# Patient Record
Sex: MALE | Race: WHITE | NOT HISPANIC OR LATINO | Employment: FULL TIME | URBAN - METROPOLITAN AREA
[De-identification: names, ages, dates, MRNs, and addresses within clinical notes are randomized per-mention and may not be internally consistent; named-entity substitution may affect disease eponyms.]

---

## 2016-12-27 NOTE — PROGRESS NOTES
MICD Discharge Summary/Instructions     Patient: Amish Guerrero  MRN: [id number]   : 1990 Age: 26 year old Sex: male   -  Focus of Treatment / Discharge Recommendations    Personal Safety/ Management of Symptoms    * Call crisis lines as needed    [facility] [phone]                [facility] [phone]  [facility] [phone]              [facility] [phone]  [facility] [phone]              [facility] [phone]  [facility] [phone]          National Suicide Prevention [phone]  [facility] [phone]            Suicide Prevention [phone]  [facility] [phone]      Abstinence/Relapse Prevention  * Take all medicines as directed.  Carry a current list of medicines with you.  * Use coping skills:  1) Meditate daily;  2) Exercise regularly;  3) Follow good nutrition;  4) Read recovery literature daily such as the Big Book, 12 x 12, and/or a daily meditation book;  5) Talk to others about your feelings and what is bothering you;  6) Practice the Twelve Steps daily (that is, at least one step each day) and complete your fourth and fifth steps within nine to twelve months of discharge.      7) Abstain from the use of all mood altering chemicals (including over the counter medications/substances such as Nyquil, Robitussin, and mouthwashes containing alcohol) except for those prescribed and monitored by a physician knowledgeable of your chemical dependency diagnosis.     Develop/Improve Independent Living/Socialization Skills: Don't forget to be assertive when you need to.     Community Resources/Supports and Discharge Planning:  See below    Continue to manage your mental health issues by doing the following:    * Following all directives of your psychiatrist in terms of medications and in terms of follow up appointments.   * Continue your psychotherapy to deal with the issues identified in your treatment plan and  "any other issues that could effect your recovery.   * Use the techniques you have learned in group including, but not limited to, REBT to curb negative thoughts before they spiral out of control.     Attend two to three community based support groups weekly, which may include AA, NA, and/or [place] [facility] Phase III [facility] Group. To enroll in Phase III Growth Group, at no charge, call the [facility] Office at [phone].  For updated AA meeting information go to [email or web address] or [email or web address].   See your medical doctor about:  1) See your doctor for regular physical exams and your dentist for regular dental care and follow all recommendations; and,  2) Inform both your current and future doctors and dentists of your chemical dependency diagnosis.      Other:   Stay Positive and don't catastrophize (by predicting a negative future.)    Remember Ex Adrian Bonum: \"Out of Bad Comes Good.\"      Remember self forgiveness.    Remember the consequences of your using.    Remember to treat yourself well.    Remember to stay away from people, places and/or events that are risky to your sobriety.    Stay in the \"here and now.\" Focus on the moment at hand.    Remember HALT.    Lean on your sober friends when triggered.     If you think about drinking, envision the negative consequences of a relapse.     Identify what you want in life and don't let anything get in your way.       The above were prepared with input from the client.        Client Signature:_______________________   Date / Time:___________      Staff Signature:________________________   Date / Time:___________                "

## 2017-01-02 ENCOUNTER — HOSPITAL ENCOUNTER (OUTPATIENT)
Dept: BEHAVIORAL HEALTH | Facility: CLINIC | Age: 27
End: 2017-01-02
Attending: SOCIAL WORKER
Payer: COMMERCIAL

## 2017-01-02 PROCEDURE — H2035 A/D TX PROGRAM, PER HOUR: HCPCS | Mod: HQ

## 2017-01-03 NOTE — PROGRESS NOTES
Patient:  Amish Guerrero           Session #25  (includes orientation and treatment plan meeting)         Phase II Session 12    Key to reading dimensions:     Bolded text in a dimension indicates information about a client at intake.    Underlined portions in a dimension indicate important information I want to carry forward from week to week as a reminder.           Adult CD Progress Note and Treatment Plan Review     Attendance  Client attended group therapy on  Monday 1/2/17  for 1.5 hours    Support group attended this week:  yes    Reporting sobriety:  Since 8/21/16.    yes  Treatment Plan     Treatment Plan Review completed on:  1/2/17     Client preferred learning style: Hands on  Demonstration    Staff Members contributing Olimpia              Received Supervision: No     Client: contributed to goals and plan.    Client received copy of plan/revised plan: yes    Client agrees with plan/revised plan: yes.     Changes to Treatment Plan: No    New Goals added since last review: No    Goals worked on since last review:  Dimension  Four, five and six      Strategies effective: yes      Strategies need these changes: n/a    ASAM Risk Rating:    Dimension 1 0 . Client's last use was 8/21/16.       Dimension 2 1 . Client has a history of IBS. It is not being medicated and it is stable. Client's last physical was over a year ago. I urged the client to see a PCP about a prescription for naltrexone and antabuse. Since he didn't have a PCP, I referred him to Dr. Portillo.   9/30: client completed his physical. He had slightly elevated liver enzymes. He was told that would return to normal with continued sobriety. Suggested again that he call his pcp for naltrexone given his cravings. He said he would.    1/2/17:  Gadiel's cravings were a one on a scale of 10.       Dimension 3 1 . Client reports he has a previous diagnosis of depression and anxiety and reports two panic attacks in the past six months. He had suicidal  "ideation last fall but has not had any ideation in the last 7-8 months. He denies suicidal ideation or intent to self harm. He was given the suicide resource list. I gave him referrals to two psychiatrists and asked him to make an appointment asap and explained the ratonale.   9/23/16: I spoke to client and urged client to make a psychiatry and therapy appointment.   9/27:    He is now working to get a psychiatry appointment and has his first therapy appointment on 9/30. He signed a release to his therapist and I left a message asking her to call. I also faxed this note to her. I subsequently spoke to her.      10/5/16:  Gadiel reported his depression and anxiety were at at 2 of 10 and he had no intent to harm himself.   He said he was \"busy making a fun weekend for his girl friend and didn't have time to be alone\".  He stated he \"was a little down on last Thursday\" but he has been \" happy and positive since then.\"   Client also reports \"I'm not a anxious about being laid off anymore\".  The client said he saw his therapist  on 9/30/16.  He stated \" she thinks I should see a therapist on a regular basis.\"  Gadiel stated he is \"upset\" by this comment because \" I don't want to delve into my past.  I accept my past and it has made me into who I am today and I like myself now as is.\"   Client stated he saw psychiatrist Dr. Ragsdale  who told Gadiel  he \"was mostly suffering  from withdrawal symptoms\" and that  \" I was on low scale for depression and low to medium for ADHD. \"  Client will return to see Dr. Mcbride in 6 weeks.   I spoke to Dr. Powell and he reported as follows: Client seemed to be under reporting. Didn't have enough symptoms to say he has anything current.   He does have MDD, recurrent, in remission and PETEY in remission. He has possible ADHD. He does have  persistent depressive disorder. No medications were prescribed at this time.  Staffed case with CAMDEN Bond. Based on the staffing, I told client that we " "would not agree with him that it is a good idea to  not do therapy as recommended, but it is his choice.      Client met with his psychiatrist on 10/4..    10/17/16:  Gadiel  reported his depression and anxiety  at 3 out of 10 because  \" I've gotten really upset about forgetting obligations, telling people I can't make it.  No real consequences yet.\"       He also stated he had some worsening problems with memory issues and that \" I need to start writing everything down.\" He also forgot his court date. I encouraged him to call Dr. Powell about it. He said he already had and he will be doing some ADHD testing. I affirmed him for being proactive on this.     10/24/16:   Gadiel said he made an appointment for 11/16 with his psychiatrist to get tested for ADD.   Client reported \"I'm concerned about my forgetfulness.\" I affirmed him on meeting this issue head on.     11/21/16:    He was tested by Dr. Titus for ADHD on 11/16.. He will get the results. He saw his therapist on 11/17.      12/5/16:   He went to an appointment with Dr. Powell on 11/29 and  he saw Dr. Titus on 11/30.    Client reports that he does not have ADHD, but that Dr. Powell, nevertheless, prescribed Vyvanse to help him with organization and  motivation.  Client said he has already felt a favorable response to the medication.     1/2/17: Client reported no anxiety or depression:   He denies any intent to harm himself or others.  He will be seeing Dr. Powell on 1/3.    Dimension 4 1. Client is externally motivated to treatment by his DWI. He is precontemplative. He said he didn't think his drinking was that bad. I tried to disabuse him of that.     1/2/17: He still asserts that he has maximum motivation for sobriety.      In order to develop motivation for change and end lingering thoughts of using, the client listened to a \"love letter\" from his drug of choice. Thereafter, he wrote a \"goodbye letter\" to his drug of choice. He then discussed " his feelings about the letter he wrote and he discussed the themes of the letters written by his peers and he read his letter to the group.     Dimension 5 2 .  Client has had no prior treatments. Therefore, he lacks the education, cognitions and behaviors needed for long term sobriety. He has continued to drink to the point he could not drive to work the next day due to setting off the interlock in his car. I suggested he enter residential treatment. He didn't want to do that.      1/2/17: Client told his girlfriend he is not ready to make a marriage commitment. He was affirmed by the group. In addition, he discussed his recovery care transition assignment.        Dimension 6 2 . Client has legal issues pending. He lives alone. He lacks a sober support system.     10/17/16:   Client will miss family skills group on 10/18 so I offered him the option to reschedule and to let me know.      1/2/17:   [place] attended his support group meeting.      Family Involvement:   none schedule this week ; family is scheudled for 10/24 and 10/26.*good insight client did actively participate      Intervention:   Cognitive Behavioral Therapy  Counselor feedback  Motivational Enhancement Therapy  Relapse prevention      Assessment:   Stages of Change Model  Preparation/Determination    Appears/Sounds:  Cooperative  Motivated  Engaged     Plan:    Focus on recovery environment       Client will re-read his letter at least twice. He was transitioned from group.     Thee MOSELEY, M.A., LEILA.

## 2017-01-06 NOTE — ADDENDUM NOTE
Encounter addended by: Thee Doan LADC on: 1/6/2017 10:40 AM<BR>     Documentation filed: Fast Note, Episodes

## 2017-03-01 ENCOUNTER — TELEPHONE (OUTPATIENT)
Dept: BEHAVIORAL HEALTH | Facility: CLINIC | Age: 27
End: 2017-03-01

## 2017-03-02 NOTE — TELEPHONE ENCOUNTER
Client called saying he was curious why alcohol had such a hold on him and he drank three days in a row at the end of January. Then he was sober for two weeks and then drank three days in a row again. Then he drank Sunday night and missed a family event on Monday. He said he hadn't been to a meeting in a month. .     I recommended that he get back into treatment. He didn't want to do that. I then recommended he seek antabuse and naltrexone and do 90 meetings in 90 days.

## 2017-04-25 ENCOUNTER — TELEPHONE (OUTPATIENT)
Dept: BEHAVIORAL HEALTH | Facility: CLINIC | Age: 27
End: 2017-04-25

## 2017-04-25 NOTE — TELEPHONE ENCOUNTER
Client called about his brother who has stopped taking suboxone and is using heroin and other opioids once again. He was looking for suggestions. I gave him these:  1) Get brother in for an assessment. Advised him about Rule 25s being free if finances are an issue.  2) Call  and see if the  will start commitment proceedings.   3) Tell parents to tell brother he can't stay with them if he won't go to treatment.

## 2019-12-04 NOTE — PROGRESS NOTES
RECOVERY SERVICES DISCHARGE SUMMARY       EVALUATION COUNSELOR:  Shawn Hanks [facility].   TREATMENT COUNSELOR:  Thee Doan [facility], MA, [place].   REFERRAL SOURCE:  Chente Gongora.   PROGRAM:  [facility].   ADMISSION DATE:  8/16/2016    LAST SESSION DATE:  01/2/2017    DISCHARGE DATE:  01/2/2017.   ADMISSION DIAGNOSES:   1.  303.90/F10.20, alcohol use disorder, severe.   2.  305.10/F17.200, tobacco use disorder, severe.   3.  304.30/F12.20, cannabis use disorder, moderate.   DISCHARGE DIAGNOSES:   1.  303.90/F10.20, alcohol use disorder, severe, in early remission.   2.  305.10/F17.200, tobacco use disorder, severe.   3.  304.30/F12.20, cannabis use disorder, moderate, in early remission.   4.  Major depressive disorder, recurrent, in remission, per his psychiatrist, Dr. Jean Powell.   5.  Generalized anxiety disorder in remission, per his psychiatrist, Dr. Jean Powell.   6.  Persistent depressive disorder per his psychiatrist, Dr. Jean Powell.   DISCHARGE STATUS:  With staff approval as the client substantially completed his treatment plan and the program's attendance requirements.   LAST USE DATE:  As reported by client:  08/21/2016   HOURS OF TREATMENT COMPLETED:  66.      PRESENTING INFORMATION:  At the time of admission, Mr. Amish Guerrero was a 25-year-old single male who received a DWI on 06/13/2016, at which time his blood alcohol concentration was 0.19.  The client has never been  and he had no children.  He was employed at the inception of treatment.      SERVICES PROVIDED:  Mr. Guerrero and the undersigned prepared a treatment plan, which will be discussed in more detail below.  In addition, he attended group therapy sessions during which time he was exposed to a variety of therapeutic techniques including, but not limited to, the following:  Aftercare planning, behavior modification, cognitive behavioral therapy, counselor feedback, education, emotional management, group  feedback, motivational enhancement therapy, relapse prevention, 12-step facilitation, mental health education, rational emotive behavioral therapy, logo therapy, existentialism and expressive art therapy.  Furthermore, he attended various skills groups, which were intended to assist him in developing sober living skills.  He attended the following skills groups:  Anger management and assertiveness training; meditation and stress reduction; positive psychology;  body image and self-esteem;  mental health education;  relapse prevention; and exercise, nutrition, and tobacco cessation.      ISSUES ADDRESSED IN TREATMENT   DIMENSION 1:  Intoxication/withdrawal:  The client's initial risk rating was 0 and his discharge risk rating was 0.  In this dimension, the client was treatment planned on 8/30/2016.  At that time, he reported that his last alcohol use was on 08/21/2016.  No treatment planning issues were implicated in this dimension at the outset.  Moreover, the client reported no chemical use while in treatment as he agreed to do if that happened.      DIMENSION 2:  Client's initial risk rating was 1 and his discharge risk rating was 1.  In this dimension, the client was treatment planned around the fact that he had not had a medical evaluation in the past year.  The client followed through on this recommendation on 9/16/2016 and completed his evaluation.  He reported that he was told that he had slightly elevated liver enzymes, but that they would return to normal with continued sobriety.  In addition, because the client was experiencing cravings, I recommended that he speak with his physician about a prescription for naltrexone and Antabuse.  The client did not follow through on this recommendation.  However, cravings did not appear to be a problematic issue with him as treatment progressed.  The client was also treated planned around the fact that he smoked cigarettes.  For this, I gave him materials to assist him  "in quitting smoking, I had him attend the exercise, nutrition, and tobacco cessation skills group and I recommended that he speak with his physician about a prescription for Chantix or Zyban.  While he did obtain information about these medications, he did not follow through and obtain a prescription.      DIMENSION 3:  Emotional/behavioral:  The client's initial risk rating was 1 and his discharge risk rating was 1.  In this dimension, the client was treatment planned around the fact that he had a history of suicidal ideation.  For this, he agreed to advise his counselor immediately if he felt like he wanted to hurt himself or if he was feeling suicidal; and, if his counselor was not around, to call 911 immediately.  The client agreed to this.  He reported no suicidal ideation while in treatment.  Moreover, the client was treatment around his history of anxiety, depression and panic attacks which are relapse triggers.  For this, he and I discussed the interrelationship between relapse and mental health issues and I referred him to psychiatry and therapy.  He met with Dr. Powell, psychiatrist, at least 3 times.  He also met with his therapist approximately 10 times while he was in treatment.  He signed releases with both providers and I coordinated care.  While in treatment, the client was tested for ADD, but he was found not to have that disorder.     Additionally, the client was treatment planned around how he would benefit from a regular exercise regimen  in order to improve his mental health and reduce his risk of relapse.  For this, the client agreed to exercise 3 days per week, 20 minutes per day.  He was substantially compliant in this regard.      DIMENSION 4:  Treatment readiness:  The client's initial risk rating was 3 and his discharge risk rating was 0.  In this dimension, the client was treatment planned around his desire to be alcohol free so that he can \"run a full life.\"  The interventions " prescribed for the client are set forth in the progress notes and treatment plan.  The client had very good insights into his chemical use and its repercussions on him and others.  Based on the treatment of his mental health issues, his attendance at support group meetings and his comments in group, it appeared that the client was extremely motivated for sobriety.      DIMENSION 5:  Relapse potential:  The client's initial risk rating was 4 and his discharge risk rating was 1.  In this dimension, the client was treatment planned around the fact that he lacked the cognitions and behaviors needed for long-term sobriety.  For this, I recommended that he attend all of scheduled skills group and complete his recovery care transition assignment, which he did.  Other interventions for this client in this dimension are set forth in the progress notes.      DIMENSION 6:  Recovery environment:  The client's initial risk rating was 2 and his discharge risk rating was 2.  In this dimension, the client was treatment planned around the fact that he lacked a sober support network.  For this, I asked him to attend at least 1 sober support group meeting a week.  The client went to support group meetings approximately 14 times.  I would have reduced the client's risk rating to zero, but for the fact that he had outstanding legal issues at the time of discharge.  In addition, the client was treatment planned around how he would like to be sober and fill his brother with hope and show others he is trying to make a change.  For this, I recommended that the client attend the family roles and communication workshop and the family program.  He attended the latter with his significant other, but not the former.  I offered him opportunities to make up the group that he missed but he declined.      STRENGTHS:  The client had a number of strengths including his friendly personality and his motivation for sobriety.      PROGNOSIS:  Favorable  assuming all continuing care recommendations are followed.      LIVING ARRANGEMENTS AT DISCHARGE:  The client was discharged to his home where he was living on his own        CONTINUING CARE RECOMMENDATIONS:    Personal Safety/ Management of Symptoms    * Call crisis lines as needed    [facility] [phone]                [facility] [phone]  [facility] [phone]              [facility] [phone]  [facility] [phone]              [facility] [phone]  [facility] [phone]          National Suicide Prevention [phone]  [facility] [phone]            Suicide Prevention [phone]  [facility] [phone]      Abstinence/Relapse Prevention  * Take all medicines as directed.  Carry a current list of medicines with you.  * Use coping skills:  1) Meditate daily;  2) Exercise regularly;  3) Follow good nutrition;  4) Read recovery literature daily such as the Big Book, 12 x 12, and/or a daily meditation book;  5) Talk to others about your feelings and what is bothering you;  6) Practice the Twelve Steps daily (that is, at least one step each day) and complete your fourth and fifth steps within nine to twelve months of discharge.       7) Abstain from the use of all mood altering chemicals (including over the counter medications/substances such as Nyquil, Robitussin, and mouthwashes containing alcohol) except for those prescribed and monitored by a physician knowledgeable of your chemical dependency diagnosis.     Develop/Improve Independent Living/Socialization Skills: Don't forget to be assertive when you need to.     Community Resources/Supports and Discharge Planning:  See below    Continue to manage your mental health issues by doing the following:                * Following all directives of your psychiatrist in terms of medications and in terms of follow up appointments.              * Continue your psychotherapy to deal with the issues  "identified in your treatment plan and any other issues that could effect your recovery.              * Use the techniques you have learned in group including, but not limited to, REBT to curb negative thoughts before they spiral out of control.     Attend two to three community based support groups weekly, which may include AA, NA, and/or [facility] Phase III Growth Group. To enroll in Phase III Growth Group, at no charge, call the Alumni Office at [phone].  For updated AA meeting information go to [email or web address] or [email or web address].   See your medical doctor about:  1) See your doctor for regular physical exams and your dentist for regular dental care and follow all recommendations; and,  2) Inform both your current and future doctors and dentists of your chemical dependency diagnosis.      Other:    Stay Positive and don't catastrophize (by predicting a negative future.)    Remember Ex Adrian Bonum: \"Out of Bad Comes Good.\"        Remember self forgiveness.    Remember the consequences of your using.    Remember to treat yourself well.    Remember to stay away from people, places and/or events that are risky to your sobriety.    Stay in the \"here and now.\" Focus on the moment at hand.    Remember HALT.    Lean on your sober friends when triggered.     If you think about drinking, envision the negative consequences of a relapse.     Identify what you want in life and don't let anything get in your way.       The above were prepared with input from the client.          This information has been disclosed to you from records protected by Federal confidentiality rules (42 CFR part 2). The Federal rules prohibit you from making any further disclosure of this information unless further disclosure is expressly permitted by the written consent of the person to whom it pertains or as otherwise permitted by 42 CFR part 2. A general authorization for the release of medical or other information is NOT sufficient " for this purpose. The Federal rules restrict any use of the information to criminally investigate or prosecute any alcohol or drug abuse patient.      ABA SANTAMARIA JD, MA, [facility]             D: 2017 14:26   T: 2017 21:47   MT: [id number]      Name:     CLARENCE ENCINAS   MRN:      [id number]        Account:      [id number]   :      1990           Visit Date:   2017      Document: [id number]     [Father] : father [Eats healthy meals and snacks] : eats healthy meals and snacks [Eats meals with family] : eats meals with family [Normal] : Normal [Yes] : Patient goes to dentist yearly [Vitamin] : Primary Fluoride Source: Vitamin [Grade ___] : Grade [unfilled] [Adequate social interactions] : adequate social interactions [Adequate behavior] : adequate behavior [Adequate performance] : adequate performance [No difficulties with Homework] : no difficulties with homework [No] : No cigarette smoke exposure [Gun in Home] : no gun in home [Exposure to tobacco] : no exposure to tobacco [Exposure to alcohol] : no exposure to alcohol [Exposure to electronic nicotine delivery system] : No exposure to electronic nicotine delivery system [Exposure to illicit drugs] : no exposure to illicit drugs [Appropriately restrained in motor vehicle] : appropriately restrained in motor vehicle [Supervised outdoor play] : supervised outdoor play [Supervised around water] : supervised around water [Wears helmet and pads] : wears helmet and pads [Parent knows child's friends] : parent knows child's friends [Parent discusses safety rules regarding adults] : parent discusses safety rules regarding adults [Family discusses home emergency plan] : family discusses home emergency plan [Monitored computer use] : monitored computer use [FreeTextEntry7] : 10 year well visit

## 2023-03-24 ENCOUNTER — LAB (OUTPATIENT)
Dept: LAB | Facility: CLINIC | Age: 33
End: 2023-03-24
Payer: COMMERCIAL

## 2023-03-24 DIAGNOSIS — Z31.41 ENCOUNTER FOR SPERM COUNT FOR FERTILITY TESTING: ICD-10-CM

## 2023-03-24 PROCEDURE — 89322 SEMEN ANAL STRICT CRITERIA: CPT

## 2023-03-28 LAB
ABNORMAL SPERM MORPHOLOGY: 98
ABSTINENCE DAYS: 5 DAYS (ref 2–7)
AGGLUTINATION: NO
ANALYSIS TEMP - CENTIGRADE: 23 CENTIGRADE
COLLECTION METHOD: ABNORMAL
COLLECTION SITE: ABNORMAL
CONSENT TO RELEASE TO PARTNER: YES
DAL- RECEIVED TIME: ABNORMAL
HEAD DEFECT: 98 %
IMMOTILE: 23 %
LIQUEFIED: YES
MIDPIECE DEFECT: 45 %
NON-PROGRESSIVE MOTILITY: 12 %
NORMAL SPERM MORPHOLOGY: 2 % NORMAL FORMS
PROGRESSIVE MOTILITY: 65 %
ROUND CELLS: <0.1 MILLION/ML
SPECIMEN PH: 7.6 PH
SPECIMEN VOLUME: 2.9 ML
SPERM CONCENTRATION: 5 MILLION/ML
TAIL DEFECT: 7 %
TIME OF ANALYSIS: ABNORMAL
TOTAL PROGRESSIVE MOTILE NUMBER: 10 MILLION
TOTAL SPERM NUMBER: 15 MILLION
VISCOUS: NO
VITALITY: ABNORMAL

## 2023-05-03 ENCOUNTER — LAB (OUTPATIENT)
Dept: LAB | Facility: CLINIC | Age: 33
End: 2023-05-03
Payer: COMMERCIAL

## 2023-05-03 DIAGNOSIS — Z31.41 ENCOUNTER FOR SPERM COUNT FOR FERTILITY TESTING: ICD-10-CM

## 2023-05-03 PROCEDURE — 89322 SEMEN ANAL STRICT CRITERIA: CPT

## 2023-05-04 LAB
ABNORMAL SPERM MORPHOLOGY: 96
ABSTINENCE DAYS: 4 DAYS (ref 2–7)
AGGLUTINATION: NO
ANALYSIS TEMP - CENTIGRADE: 22 CENTIGRADE
COLLECTION METHOD: ABNORMAL
COLLECTION SITE: ABNORMAL
CONSENT TO RELEASE TO PARTNER: YES
DAL- RECEIVED TIME: ABNORMAL
HEAD DEFECT: 96 %
IMMOTILE: 12 %
LIQUEFIED: YES
MIDPIECE DEFECT: 38 %
NON-PROGRESSIVE MOTILITY: 4 %
NORMAL SPERM MORPHOLOGY: 4 % NORMAL FORMS
PROGRESSIVE MOTILITY: 84 %
ROUND CELLS: <0.1 MILLION/ML
SPECIMEN PH: 8 PH
SPECIMEN VOLUME: 3.4 ML
SPERM CONCENTRATION: 5 MILLION/ML
TAIL DEFECT: 9 %
TIME OF ANALYSIS: ABNORMAL
TOTAL PROGRESSIVE MOTILE NUMBER: 14 MILLION
TOTAL SPERM NUMBER: 17 MILLION
VISCOUS: NO
VITALITY: ABNORMAL

## 2024-04-08 ENCOUNTER — LAB (OUTPATIENT)
Dept: LAB | Facility: CLINIC | Age: 34
End: 2024-04-08
Payer: COMMERCIAL

## 2024-04-08 DIAGNOSIS — Z31.41 ENCOUNTER FOR SPERM COUNT FOR FERTILITY TESTING: ICD-10-CM

## 2024-04-08 LAB
ABNORMAL SPERM MORPHOLOGY: 94
ABSTINENCE DAYS: 2 DAYS (ref 2–7)
AGGLUTINATION: NO
ANALYSIS TEMP - CENTIGRADE: 21 CENTIGRADE
COLLECTION METHOD: ABNORMAL
COLLECTION SITE: ABNORMAL
CONSENT TO RELEASE TO PARTNER: YES
DAL- RECEIVED TIME: ABNORMAL
HEAD DEFECT: 94 %
IMMOTILE: 20 %
LIQUEFIED: YES
MIDPIECE DEFECT: 39 %
NON-PROGRESSIVE MOTILITY: 4 %
NORMAL SPERM MORPHOLOGY: 6 % NORMAL FORMS
PROGRESSIVE MOTILITY: 76 %
ROUND CELLS: 0.1 MILLION/ML
SPECIMEN PH: 7.6 PH
SPECIMEN VOLUME: 2.4 ML
SPERM CONCENTRATION: 10.4 MILLION/ML
TAIL DEFECT: 2 %
TIME OF ANALYSIS: ABNORMAL
TOTAL PROGRESSIVE MOTILE NUMBER: 19 MILLION
TOTAL SPERM NUMBER: 25 MILLION
VISCOUS: NO
VITALITY: ABNORMAL

## 2024-04-08 PROCEDURE — 89322 SEMEN ANAL STRICT CRITERIA: CPT
